# Patient Record
Sex: MALE | Race: BLACK OR AFRICAN AMERICAN | Employment: FULL TIME | URBAN - METROPOLITAN AREA
[De-identification: names, ages, dates, MRNs, and addresses within clinical notes are randomized per-mention and may not be internally consistent; named-entity substitution may affect disease eponyms.]

---

## 2018-06-27 ENCOUNTER — OFFICE VISIT (OUTPATIENT)
Dept: INTERNAL MEDICINE CLINIC | Facility: CLINIC | Age: 53
End: 2018-06-27

## 2018-06-27 VITALS
WEIGHT: 203 LBS | HEART RATE: 70 BPM | BODY MASS INDEX: 28.42 KG/M2 | DIASTOLIC BLOOD PRESSURE: 88 MMHG | RESPIRATION RATE: 16 BRPM | SYSTOLIC BLOOD PRESSURE: 139 MMHG | HEIGHT: 71 IN

## 2018-06-27 DIAGNOSIS — Z72.0 TOBACCO USE: ICD-10-CM

## 2018-06-27 DIAGNOSIS — R22.32 MASS OF SKIN OF LEFT SHOULDER: Primary | ICD-10-CM

## 2018-06-27 PROCEDURE — 99203 OFFICE O/P NEW LOW 30 MIN: CPT | Performed by: PHYSICIAN ASSISTANT

## 2018-06-27 NOTE — PROGRESS NOTES
HPI:    Patient ID: Juan Lewis is a 48year old male. HPI   Patient presents today to re-establish care and with some concerns of left shoulder lump. He was previously seen by Dr. Richar Hernandez but has been 6 years since his last visit. Overall doing okay. Gastrointestinal: Negative for nausea, vomiting, abdominal pain and diarrhea. Genitourinary: Negative for dysuria, frequency and hematuria. Musculoskeletal: Negative for myalgias, joint pain and gait problem.    Skin:        Large mass of skin of left s Patient here with large mass of the left neck/shoulder area for the last several weeks. Exam findings as above. Possible cyst versus lipoma. Overall he has not been bothered much by the mass until the last few weeks when it significantly enlarged.   Refe

## 2018-07-03 ENCOUNTER — OFFICE VISIT (OUTPATIENT)
Dept: SURGERY | Facility: CLINIC | Age: 53
End: 2018-07-03

## 2018-07-03 VITALS — BODY MASS INDEX: 28 KG/M2 | HEIGHT: 71 IN | WEIGHT: 200 LBS

## 2018-07-03 DIAGNOSIS — R22.1 MASS OF LEFT SIDE OF NECK: Primary | ICD-10-CM

## 2018-07-03 DIAGNOSIS — D18.00 HEMANGIOMA: ICD-10-CM

## 2018-07-03 PROCEDURE — 99212 OFFICE O/P EST SF 10 MIN: CPT | Performed by: SURGERY

## 2018-07-03 PROCEDURE — 99244 OFF/OP CNSLTJ NEW/EST MOD 40: CPT | Performed by: SURGERY

## 2018-07-03 NOTE — H&P
History and Physical      Renny Reesepool is a 48year old male. HPI   Patient presents with:  Mass: Patient referred by Mason Hidalgo PA-C for mass of skin of left shoulder.  Patient states he had a small mass there for \"awhile\" and over past 3-4 week of Systems    PHYSICAL EXAM   Ht 5' 11\" (1.803 m)   Wt 200 lb (90.7 kg)   BMI 27.89 kg/m²  No LMP for male patient.   Constitutional: appears well hydrated alert and responsive no acute distress noted  Head/Face: normocephalic  Nose/Mouth/Throat: nose and

## 2018-07-09 ENCOUNTER — TELEPHONE (OUTPATIENT)
Dept: SURGERY | Facility: CLINIC | Age: 53
End: 2018-07-09

## 2018-07-09 NOTE — TELEPHONE ENCOUNTER
[address], spoke with Kiersten Gallagher to initiate prior authorization for MRI Neck (CPT: [id number]) on 07/11/2018. Received case # [id number], further clinical information required. Given fax number [phone], clinicals faxed as requested.  Awaiting further i

## 2018-07-09 NOTE — TELEPHONE ENCOUNTER
Good afternoon,     Dr Serena Yates has ordered an MRI of the Neck (CPT 19341) for this patient. They are scheduled for 7/11/2018. Please obtain authorization through [employer] at [phone]. Call me if you have any questions.      Thank you,     [place]

## 2018-07-11 ENCOUNTER — HOSPITAL ENCOUNTER (OUTPATIENT)
Dept: MRI IMAGING | Age: 53
Discharge: HOME | End: 2018-07-11
Attending: SURGERY
Payer: COMMERCIAL

## 2018-07-11 DIAGNOSIS — R22.1 MASS OF LEFT SIDE OF NECK: ICD-10-CM

## 2018-07-11 DIAGNOSIS — D18.00 HEMANGIOMA: ICD-10-CM

## 2018-07-11 PROCEDURE — A9576 INJ PROHANCE MULTIPACK: HCPCS | Performed by: SURGERY

## 2018-07-11 PROCEDURE — 70543 MRI ORBT/FAC/NCK W/O &W/DYE: CPT | Performed by: SURGERY

## 2018-07-13 ENCOUNTER — TELEPHONE (OUTPATIENT)
Dept: INTERNAL MEDICINE CLINIC | Facility: CLINIC | Age: 53
End: 2018-07-13

## 2018-07-13 DIAGNOSIS — R22.2 SUPRACLAVICULAR MASS: Primary | ICD-10-CM

## 2018-07-13 NOTE — TELEPHONE ENCOUNTER
Pt called in requesting to speak with [address] directly in regards to his MRI results from 7/11.

## 2018-07-16 ENCOUNTER — TELEPHONE (OUTPATIENT)
Dept: SURGERY | Facility: CLINIC | Age: 53
End: 2018-07-16

## 2018-07-16 NOTE — TELEPHONE ENCOUNTER
----- Message from Merline Rudd, MD sent at 7/12/2018 12:56 PM CDT -----  MRI images reviewed and discussed with ENT and patient. I would recommend a tertiary care center eval with a head and neck cancer surgeon.  Operative approach may also require neurosu

## 2018-07-31 NOTE — TELEPHONE ENCOUNTER
Spoke with patient and relayed JSK message below. Patient states, \"Dr. Chucho Call was supposed to speak with Dr. Merritt Hawkins. I've been waiting on him to enter a referral to [facility] or [place] or whatever. I do want him to call me. \"

## 2018-07-31 NOTE — TELEPHONE ENCOUNTER
Please contact patient. Since this message, he spoke directly with Dr. Ethan Lopez regarding test results. He agreed to proceed with ENT referral at tertiary care center such as [facility]. Please make sure that the patient has made arrangements.   Let me k

## 2018-08-01 NOTE — TELEPHONE ENCOUNTER
I spoke with the patient. I have not seen the patient in about 6 years. I do not believe that I spoke directly with Dr. Lynette Zavala about this case. Patient with abnormal MRI and possible malignancy.   Dr. Lynette Zavala gave patient advised that he should see ENT at

## 2018-08-01 NOTE — TELEPHONE ENCOUNTER
I spoke with Dr. Serena Yates over the phone. Reviewed MRI results. I then spoke with Dr. Nicanor Keyes, 1 of our ENT doctors and she reviewed the findings of the MRI. She advised that the patient see Dr. Ethan Magaña at [facility].   He is the chief of

## 2018-08-08 NOTE — TELEPHONE ENCOUNTER
Please contact the patient. I spoke with the people at [facility] yesterday. They have been making multiple attempts to contact the patient in order to arrange follow-up with the oncology doctors and surgeons they are to treat his mass.   It is very important

## 2018-08-08 NOTE — TELEPHONE ENCOUNTER
Spoke with pt and Dr Kassidy Marcano message given. Pt states he was trying to call and felt like [facility] was giving him the [facility] number below given to pt and pt states he will call now.   Informed pt if he is having trouble scheduling to call ou

## 2018-08-09 NOTE — TELEPHONE ENCOUNTER
Stefan called back regarding  [facility]  Intake Nurse from Dr Larry Mendez office is asking to have an order/diagnoise code faxed #[phone]    Nurse strosa maria can get the pt in on Monday   Please advise

## 2018-08-10 ENCOUNTER — TELEPHONE (OUTPATIENT)
Dept: INTERNAL MEDICINE CLINIC | Facility: CLINIC | Age: 53
End: 2018-08-10

## 2018-08-10 NOTE — TELEPHONE ENCOUNTER
Spoke to patient this morning. States he was contacted by a nurse at [facility] yesterday to schedule an appointment but has not yet heard back from them.   This morning I spoke to Laura at [facility] and states he would have the nurse contact the patient before

## 2018-09-12 ENCOUNTER — TELEPHONE (OUTPATIENT)
Dept: INTERNAL MEDICINE CLINIC | Facility: CLINIC | Age: 53
End: 2018-09-12

## 2018-09-12 NOTE — TELEPHONE ENCOUNTER
Dr. Deanna Cannon called back regarding to speak to Dr. Kaity Weiner. Doctor asked if he can please call back.  [phone]

## 2018-09-12 NOTE — TELEPHONE ENCOUNTER
Dr Miguel Kim requesting Dr Renzo Junior call him tomorrow  To discuss pt's surgery, possible radiation treatment    Aware CORDELL not in office today- said not emergent & is fine to wait until  tomorrow        Ph# [phone]- to his  & will have

## 2018-09-17 NOTE — TELEPHONE ENCOUNTER
Dr. Bethany Tomlinson called back stts pt needs to start radiation and needs to see an oncologist. Pt wants an oncologist closer to home and Dr. Bethany Tomlinson is asking if Dr. Nohemi Stephens can help pt with this.        Dr. Bethany Tomlinson left a new call back #- [phone]

## 2018-09-18 NOTE — TELEPHONE ENCOUNTER
I spoke with Dr. Emre Hannon by phone. 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 Patient will need radiation but wants to do it closer to home.   I gave Dr. Emre Hannon the name of Dr. Luba Gunn, who would be the rad

## 2020-04-10 ENCOUNTER — TELEPHONE (OUTPATIENT)
Dept: INTERNAL MEDICINE CLINIC | Facility: CLINIC | Age: 55
End: 2020-04-10

## 2020-04-10 ENCOUNTER — NURSE TRIAGE (OUTPATIENT)
Dept: INTERNAL MEDICINE CLINIC | Facility: CLINIC | Age: 55
End: 2020-04-10

## 2020-04-10 NOTE — TELEPHONE ENCOUNTER
Patient called feeling better. Would like to review symptoms and management and would like a note for work. He has a phone visit scheduled for Monday. Patient in agreement with plan. Tried to call pt - not able to leave a voicemail - mailbox is full  Will try again later

## 2020-04-10 NOTE — TELEPHONE ENCOUNTER
Action Requested: Summary for Provider     []  Critical Lab, Recommendations Needed  [] Need Additional Advice  []   FYI    []   Need Orders  [] Need Medications Sent to Pharmacy  []  Other     SUMMARY: Zackary Eaves for Pasqual Sacks .  Pt stated that on Monday he chet

## 2020-04-10 NOTE — TELEPHONE ENCOUNTER
Spoke with patient--he is scheduled for virtual phone visit on Monday, but requesting recommendations until then    Sent to LOUISE Campbell    Please advise

## 2020-04-13 ENCOUNTER — TELEPHONE (OUTPATIENT)
Dept: INTERNAL MEDICINE CLINIC | Facility: CLINIC | Age: 55
End: 2020-04-13

## 2020-04-13 ENCOUNTER — VIRTUAL PHONE E/M (OUTPATIENT)
Dept: INTERNAL MEDICINE CLINIC | Facility: CLINIC | Age: 55
End: 2020-04-13
Payer: COMMERCIAL

## 2020-04-13 DIAGNOSIS — B34.9 VIRAL INFECTION: Primary | ICD-10-CM

## 2020-04-13 PROCEDURE — 99213 OFFICE O/P EST LOW 20 MIN: CPT | Performed by: NURSE PRACTITIONER

## 2020-04-13 NOTE — ASSESSMENT & PLAN NOTE
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 Last Tuesday he had a fever of 101 and was dizzy. He had no other symptoms. Denies, headache, body aches, sore throat, chills, nausea, vomiting or diarrhea.  Pa

## 2020-04-13 NOTE — TELEPHONE ENCOUNTER
Telephone Check-In    Stephanie Canela verbally consents to a Virtual/Telephone Check-In service on 04/13/20. Patient understands and accepts financial responsibility for any deductible, co-insurance and/or co-pays associated with this service.     Duration of symptoms or acute distress.

## 2020-04-13 NOTE — TELEPHONE ENCOUNTER
Patient calling stating he had a telephone visit with MING Hernandez today. Per patient, he was wondering how he can access letter written by MING Hernandez for his job to be off until 4/22/20. Patient informed letter sent in My Chart.    Patient  s

## 2020-04-14 NOTE — TELEPHONE ENCOUNTER
Called patient back. He is having trouble accessing myChart. Requesting letter to mail to his apartment. [id number].     Pearl Carrillo, ANP

## 2020-08-31 ENCOUNTER — LAB ENCOUNTER (OUTPATIENT)
Dept: LAB | Facility: HOSPITAL | Age: 55
End: 2020-08-31
Attending: INTERNAL MEDICINE
Payer: COMMERCIAL

## 2020-08-31 ENCOUNTER — OFFICE VISIT (OUTPATIENT)
Dept: INTERNAL MEDICINE CLINIC | Facility: CLINIC | Age: 55
End: 2020-08-31
Payer: COMMERCIAL

## 2020-08-31 VITALS
HEIGHT: 71 IN | SYSTOLIC BLOOD PRESSURE: 140 MMHG | BODY MASS INDEX: 28.98 KG/M2 | RESPIRATION RATE: 20 BRPM | DIASTOLIC BLOOD PRESSURE: 88 MMHG | WEIGHT: 207 LBS | HEART RATE: 96 BPM

## 2020-08-31 DIAGNOSIS — Z85.831 HISTORY OF SARCOMA: ICD-10-CM

## 2020-08-31 DIAGNOSIS — R20.2 TINGLING: ICD-10-CM

## 2020-08-31 DIAGNOSIS — Z00.00 ROUTINE PHYSICAL EXAMINATION: ICD-10-CM

## 2020-08-31 DIAGNOSIS — F17.200 TOBACCO USE DISORDER: ICD-10-CM

## 2020-08-31 DIAGNOSIS — Z00.00 ROUTINE PHYSICAL EXAMINATION: Primary | ICD-10-CM

## 2020-08-31 LAB
ALBUMIN SERPL-MCNC: 3.7 G/DL (ref 3.4–5)
ALBUMIN/GLOB SERPL: 1 {RATIO} (ref 1–2)
ALP LIVER SERPL-CCNC: 68 U/L (ref 45–117)
ALT SERPL-CCNC: 28 U/L (ref 16–61)
ANION GAP SERPL CALC-SCNC: 6 MMOL/L (ref 0–18)
AST SERPL-CCNC: 17 U/L (ref 15–37)
BASOPHILS # BLD AUTO: 0.05 X10(3) UL (ref 0–0.2)
BASOPHILS NFR BLD AUTO: 0.9 %
BILIRUB SERPL-MCNC: 0.2 MG/DL (ref 0.1–2)
BUN BLD-MCNC: 9 MG/DL (ref 7–18)
BUN/CREAT SERPL: 10.8 (ref 10–20)
CALCIUM BLD-MCNC: 9.3 MG/DL (ref 8.5–10.1)
CHLORIDE SERPL-SCNC: 109 MMOL/L (ref 98–112)
CHOLEST SMN-MCNC: 259 MG/DL (ref ?–200)
CO2 SERPL-SCNC: 27 MMOL/L (ref 21–32)
COMPLEXED PSA SERPL-MCNC: 0.44 NG/ML (ref ?–4)
CREAT BLD-MCNC: 0.83 MG/DL (ref 0.7–1.3)
DEPRECATED RDW RBC AUTO: 44.6 FL (ref 35.1–46.3)
EOSINOPHIL # BLD AUTO: 0.07 X10(3) UL (ref 0–0.7)
EOSINOPHIL NFR BLD AUTO: 1.2 %
ERYTHROCYTE [DISTWIDTH] IN BLOOD BY AUTOMATED COUNT: 12.9 % (ref 11–15)
GLOBULIN PLAS-MCNC: 3.7 G/DL (ref 2.8–4.4)
GLUCOSE BLD-MCNC: 81 MG/DL (ref 70–99)
HCT VFR BLD AUTO: 44.5 % (ref 39–53)
HDLC SERPL-MCNC: 59 MG/DL (ref 40–59)
HGB BLD-MCNC: 15.1 G/DL (ref 13–17.5)
IMM GRANULOCYTES # BLD AUTO: 0.01 X10(3) UL (ref 0–1)
IMM GRANULOCYTES NFR BLD: 0.2 %
LDLC SERPL CALC-MCNC: 167 MG/DL (ref ?–100)
LYMPHOCYTES # BLD AUTO: 0.95 X10(3) UL (ref 1–4)
LYMPHOCYTES NFR BLD AUTO: 16.3 %
M PROTEIN MFR SERPL ELPH: 7.4 G/DL (ref 6.4–8.2)
MCH RBC QN AUTO: 32.1 PG (ref 26–34)
MCHC RBC AUTO-ENTMCNC: 33.9 G/DL (ref 31–37)
MCV RBC AUTO: 94.7 FL (ref 80–100)
MONOCYTES # BLD AUTO: 0.77 X10(3) UL (ref 0.1–1)
MONOCYTES NFR BLD AUTO: 13.2 %
NEUTROPHILS # BLD AUTO: 3.99 X10 (3) UL (ref 1.5–7.7)
NEUTROPHILS # BLD AUTO: 3.99 X10(3) UL (ref 1.5–7.7)
NEUTROPHILS NFR BLD AUTO: 68.2 %
NONHDLC SERPL-MCNC: 200 MG/DL (ref ?–130)
OSMOLALITY SERPL CALC.SUM OF ELEC: 292 MOSM/KG (ref 275–295)
PATIENT FASTING Y/N/NP: YES
PATIENT FASTING Y/N/NP: YES
PLATELET # BLD AUTO: 286 10(3)UL (ref 150–450)
POTASSIUM SERPL-SCNC: 4.1 MMOL/L (ref 3.5–5.1)
RBC # BLD AUTO: 4.7 X10(6)UL (ref 4.3–5.7)
SODIUM SERPL-SCNC: 142 MMOL/L (ref 136–145)
TRIGL SERPL-MCNC: 165 MG/DL (ref 30–149)
TSI SER-ACNC: 0.56 MIU/ML (ref 0.36–3.74)
VLDLC SERPL CALC-MCNC: 33 MG/DL (ref 0–30)
WBC # BLD AUTO: 5.8 X10(3) UL (ref 4–11)

## 2020-08-31 PROCEDURE — 84443 ASSAY THYROID STIM HORMONE: CPT | Performed by: INTERNAL MEDICINE

## 2020-08-31 PROCEDURE — 80053 COMPREHEN METABOLIC PANEL: CPT | Performed by: INTERNAL MEDICINE

## 2020-08-31 PROCEDURE — 3008F BODY MASS INDEX DOCD: CPT | Performed by: INTERNAL MEDICINE

## 2020-08-31 PROCEDURE — 3077F SYST BP >= 140 MM HG: CPT | Performed by: INTERNAL MEDICINE

## 2020-08-31 PROCEDURE — 99396 PREV VISIT EST AGE 40-64: CPT | Performed by: INTERNAL MEDICINE

## 2020-08-31 PROCEDURE — 80061 LIPID PANEL: CPT | Performed by: INTERNAL MEDICINE

## 2020-08-31 PROCEDURE — 3079F DIAST BP 80-89 MM HG: CPT | Performed by: INTERNAL MEDICINE

## 2020-08-31 PROCEDURE — 85025 COMPLETE CBC W/AUTO DIFF WBC: CPT | Performed by: INTERNAL MEDICINE

## 2020-08-31 PROCEDURE — 36415 COLL VENOUS BLD VENIPUNCTURE: CPT | Performed by: INTERNAL MEDICINE

## 2020-08-31 RX ORDER — VARENICLINE TARTRATE 1 MG/1
1 TABLET, FILM COATED ORAL 2 TIMES DAILY
Qty: 60 TABLET | Refills: 6 | Status: SHIPPED | OUTPATIENT
Start: 2020-08-31 | End: 2021-03-29

## 2020-08-31 RX ORDER — AMOXICILLIN 500 MG/1
500 CAPSULE ORAL 3 TIMES DAILY
COMMUNITY

## 2020-08-31 RX ORDER — AMOXICILLIN 500 MG/1
500 CAPSULE ORAL 3 TIMES DAILY
COMMUNITY
Start: 2020-08-26 | End: 2020-08-31 | Stop reason: ALTCHOICE

## 2020-08-31 RX ORDER — PSEUDOEPHEDRINE HCL 30 MG
100 TABLET ORAL
COMMUNITY
Start: 2018-08-31

## 2020-08-31 NOTE — PROGRESS NOTES
HPI:    Patient ID: Dc Harris is a 50year old male. HPI  Patient is here requesting a physical exam.  Also follow-up on chronic medical issues. I last saw him back in 2012.   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 HENT: Negative for hearing loss. Eyes: Negative for visual disturbance. Respiratory: Negative for cough and shortness of breath. Cardiovascular: Negative for chest pain and palpitations. negative in the right inguinal area and confirmed negative in the left inguinal area. Genitourinary:    Testes and penis normal.   Circumcised. Musculoskeletal: He exhibits no tenderness.       Comments: Left supraclavicular area with surgical reconstruct reconstruction surgery in the left supraclavicular area. No evidence of recurrence on exam today. 4. Tingling  Atypical intermittent tingling of both posterior thighs. Distribution would not favor sciatica or lumbar radiculopathy.   May be some other l

## 2022-04-27 ENCOUNTER — OFFICE VISIT (OUTPATIENT)
Dept: INTERNAL MEDICINE CLINIC | Facility: CLINIC | Age: 57
End: 2022-04-27
Payer: COMMERCIAL

## 2022-04-27 VITALS
RESPIRATION RATE: 20 BRPM | SYSTOLIC BLOOD PRESSURE: 156 MMHG | BODY MASS INDEX: 29.82 KG/M2 | HEART RATE: 92 BPM | TEMPERATURE: 98 F | DIASTOLIC BLOOD PRESSURE: 92 MMHG | HEIGHT: 71 IN | WEIGHT: 213 LBS

## 2022-04-27 DIAGNOSIS — F17.200 TOBACCO USE DISORDER: ICD-10-CM

## 2022-04-27 DIAGNOSIS — E78.5 HYPERLIPIDEMIA, UNSPECIFIED HYPERLIPIDEMIA TYPE: ICD-10-CM

## 2022-04-27 DIAGNOSIS — Z00.00 ROUTINE PHYSICAL EXAMINATION: Primary | ICD-10-CM

## 2022-04-27 DIAGNOSIS — M79.604 RIGHT LEG PAIN: ICD-10-CM

## 2022-04-27 DIAGNOSIS — R03.0 ELEVATED BP WITHOUT DIAGNOSIS OF HYPERTENSION: ICD-10-CM

## 2022-04-27 DIAGNOSIS — R20.2 TINGLING IN EXTREMITIES: ICD-10-CM

## 2022-04-27 DIAGNOSIS — M79.602 LEFT ARM PAIN: ICD-10-CM

## 2022-04-27 PROCEDURE — 3080F DIAST BP >= 90 MM HG: CPT | Performed by: INTERNAL MEDICINE

## 2022-04-27 PROCEDURE — 99396 PREV VISIT EST AGE 40-64: CPT | Performed by: INTERNAL MEDICINE

## 2022-04-27 PROCEDURE — 3077F SYST BP >= 140 MM HG: CPT | Performed by: INTERNAL MEDICINE

## 2022-04-27 PROCEDURE — 3008F BODY MASS INDEX DOCD: CPT | Performed by: INTERNAL MEDICINE

## 2022-08-23 NOTE — PROGRESS NOTES
Doing well. HPI:    Patient ID: Delilah Moreland is a 50year old male. HPI Viral Syndrome  50year old male who works in the [employer] in [facility]. Three co-workers have tested positive for covid-19.  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 Last Tuesday he had a fever of 101 and was dizzy. He had no other symptoms.  Denies, headache, body ach

## 2023-05-31 ENCOUNTER — OFFICE VISIT (OUTPATIENT)
Dept: INTERNAL MEDICINE CLINIC | Facility: CLINIC | Age: 58
End: 2023-05-31

## 2023-05-31 VITALS
SYSTOLIC BLOOD PRESSURE: 158 MMHG | HEIGHT: 71 IN | WEIGHT: 214 LBS | DIASTOLIC BLOOD PRESSURE: 98 MMHG | BODY MASS INDEX: 29.96 KG/M2 | OXYGEN SATURATION: 95 % | RESPIRATION RATE: 18 BRPM | HEART RATE: 81 BPM

## 2023-05-31 DIAGNOSIS — I10 ESSENTIAL HYPERTENSION: Primary | ICD-10-CM

## 2023-05-31 DIAGNOSIS — M79.605 LEFT LEG PAIN: ICD-10-CM

## 2023-05-31 DIAGNOSIS — M77.11 LATERAL EPICONDYLITIS OF RIGHT ELBOW: ICD-10-CM

## 2023-05-31 PROCEDURE — 99214 OFFICE O/P EST MOD 30 MIN: CPT | Performed by: INTERNAL MEDICINE

## 2023-05-31 PROCEDURE — 3080F DIAST BP >= 90 MM HG: CPT | Performed by: INTERNAL MEDICINE

## 2023-05-31 PROCEDURE — 3077F SYST BP >= 140 MM HG: CPT | Performed by: INTERNAL MEDICINE

## 2023-05-31 PROCEDURE — 3008F BODY MASS INDEX DOCD: CPT | Performed by: INTERNAL MEDICINE

## 2023-05-31 RX ORDER — AMLODIPINE BESYLATE 5 MG/1
5 TABLET ORAL DAILY
Qty: 30 TABLET | Refills: 5 | Status: SHIPPED | OUTPATIENT
Start: 2023-05-31

## 2024-01-20 ENCOUNTER — OFFICE VISIT (OUTPATIENT)
Dept: INTERNAL MEDICINE CLINIC | Facility: CLINIC | Age: 59
End: 2024-01-20

## 2024-01-20 VITALS
WEIGHT: 219 LBS | HEART RATE: 94 BPM | HEIGHT: 71 IN | TEMPERATURE: 98 F | RESPIRATION RATE: 20 BRPM | DIASTOLIC BLOOD PRESSURE: 100 MMHG | SYSTOLIC BLOOD PRESSURE: 156 MMHG | BODY MASS INDEX: 30.66 KG/M2

## 2024-01-20 DIAGNOSIS — F17.200 TOBACCO USE DISORDER: ICD-10-CM

## 2024-01-20 DIAGNOSIS — Z12.11 COLON CANCER SCREENING: ICD-10-CM

## 2024-01-20 DIAGNOSIS — E78.5 HYPERLIPIDEMIA, UNSPECIFIED HYPERLIPIDEMIA TYPE: ICD-10-CM

## 2024-01-20 DIAGNOSIS — Z00.00 ROUTINE PHYSICAL EXAMINATION: Primary | ICD-10-CM

## 2024-01-20 DIAGNOSIS — I10 ESSENTIAL HYPERTENSION: ICD-10-CM

## 2024-01-20 PROCEDURE — 3080F DIAST BP >= 90 MM HG: CPT | Performed by: INTERNAL MEDICINE

## 2024-01-20 PROCEDURE — 3077F SYST BP >= 140 MM HG: CPT | Performed by: INTERNAL MEDICINE

## 2024-01-20 PROCEDURE — 99396 PREV VISIT EST AGE 40-64: CPT | Performed by: INTERNAL MEDICINE

## 2024-01-20 PROCEDURE — 3008F BODY MASS INDEX DOCD: CPT | Performed by: INTERNAL MEDICINE

## 2024-01-20 RX ORDER — AMLODIPINE BESYLATE 5 MG/1
5 TABLET ORAL DAILY
Qty: 30 TABLET | Refills: 5 | Status: SHIPPED | OUTPATIENT
Start: 2024-01-20

## 2024-01-20 NOTE — PROGRESS NOTES
HPI:    Patient ID: Vitaly Dumont is a 58 year old male.    HPI  Patient is here requesting general physical exam and follow-up on chronic medical issues as listed below.  Last seen here on May 31 of last year.  At that time blood pressure 168/96.  He was started on amlodipine 5 mg a day.  Advised to follow-up in 1 to 3 months.  He returns today.  Also with history of hyperlipidemia.  Sarcoma with surgery back in 2018.  Also with tobacco use.  Health maintenance and vaccination status reviewed.  He is overdue on multiple issues.    Has not been taking BP meds regularly; takes them now but not daily. He is not good with taking pills. Diet is not a lot of salt. Not checking BP. Pt walks for exercise; 20 -30 minutes about 3 days a week. Weight has been steadily going up. Still smoking about a half PPD.     Patient Active Problem List   Diagnosis    Tobacco use    History of sarcoma          HISTORY:  Past Medical History:   Diagnosis Date    Colon polyps       Past Surgical History:   Procedure Laterality Date    COLONOSCOPY  06/11/2012    EGD  06/11/2012    EXCIS PRIMARY GANGLION WRIST Right 1980    SPINE FUSION,ANTER,3 SGMTS  10/2010    C3 - C7    TONSILLECTOMY  1969    VASECTOMY  2008      History reviewed. No pertinent family history.   Social History     Socioeconomic History    Marital status:     Number of children: 4   Occupational History    Occupation:    Tobacco Use    Smoking status: Every Day     Packs/day: 0.50     Years: 17.00     Additional pack years: 0.00     Total pack years: 8.50     Types: Cigarettes    Smokeless tobacco: Never    Tobacco comments:     patch did not work per pt.   Substance and Sexual Activity    Alcohol use: Yes          Review of Systems          Current Outpatient Medications   Medication Sig Dispense Refill    amLODIPine 5 MG Oral Tab Take 1 tablet (5 mg total) by mouth daily. 30 tablet 5     Allergies:No Known Allergies     PHYSICAL EXAM:   BP (!) 176/98 (BP  Location: Left arm, Patient Position: Sitting, Cuff Size: large)   Pulse 94   Temp 97.6 °F (36.4 °C) (Other)   Resp 20   Ht 5' 11\" (1.803 m)   Wt 219 lb (99.3 kg)   BMI 30.54 kg/m²      Physical Exam  Constitutional:       Appearance: Normal appearance. He is well-developed.   HENT:      Right Ear: Tympanic membrane and ear canal normal.      Left Ear: Tympanic membrane and ear canal normal.      Nose: Nose normal.      Mouth/Throat:      Pharynx: No oropharyngeal exudate or posterior oropharyngeal erythema.   Eyes:      Conjunctiva/sclera: Conjunctivae normal.      Pupils: Pupils are equal, round, and reactive to light.   Neck:      Thyroid: No thyromegaly.      Vascular: No carotid bruit.   Cardiovascular:      Rate and Rhythm: Normal rate and regular rhythm.      Pulses: Normal pulses.      Heart sounds: Normal heart sounds. No murmur heard.  Pulmonary:      Effort: Pulmonary effort is normal.      Breath sounds: Normal breath sounds. No wheezing or rales.   Abdominal:      General: Bowel sounds are normal.      Palpations: Abdomen is soft. There is no mass.      Tenderness: There is no abdominal tenderness.      Hernia: There is no hernia in the left inguinal area.   Genitourinary:     Penis: Normal and circumcised.       Testes: Normal.   Musculoskeletal:      Right lower leg: No edema.      Left lower leg: No edema.   Lymphadenopathy:      Cervical: No cervical adenopathy.   Skin:     General: Skin is warm and dry.      Findings: No rash.   Neurological:      General: No focal deficit present.      Mental Status: He is alert.      Cranial Nerves: No cranial nerve deficit.      Coordination: Coordination normal.   Psychiatric:         Mood and Affect: Mood normal.         Behavior: Behavior normal.         Thought Content: Thought content normal.         Judgment: Judgment normal.          Wt Readings from Last 6 Encounters:   01/20/24 219 lb (99.3 kg)   05/31/23 214 lb (97.1 kg)   04/27/22 213 lb (96.6  kg)   08/31/20 207 lb (93.9 kg)   07/03/18 200 lb (90.7 kg)   06/27/18 203 lb (92.1 kg)             ASSESSMENT/PLAN:   1. Routine physical examination  Physical exam remarkable for elevated blood pressure.  Somewhat overweight status.  Encouraged diet, exercise, weight loss.  Will check fasting blood work and contact patient with results.  - CBC With Differential With Platelet  - Comp Metabolic Panel (14)  - Lipid Panel  - TSH W Reflex To Free T4  - PSA Total, Screen; Future    2. Essential hypertension  Blood pressure remains elevated.  He is not compliant with his medications.  Advised in very certain terms that he must take his amlodipine 5 mg every day.  Poorly controlled blood pressure puts him at risk for heart attack, stroke, and other health issues.  Given printed information and access to educational videos about lifestyle and high blood pressure.  Follow-up in 3 months.  If still elevated at that time, consider addition of chlorthalidone or increasing dose of amlodipine.    3. Hyperlipidemia, unspecified hyperlipidemia type  Check levels.  On no medication right now.    4. Tobacco use disorder  Strongly encourage smoking cessation.  May consider low-dose CT scan screening in future visits.    5. Colon cancer screening  Patient is overdue for follow-up with colon cancer screening.  Given order.  - [facility] GI Telephone Colon Screen         Meds This Visit:  Requested Prescriptions      No prescriptions requested or ordered in this encounter       Imaging & Referrals:  None         Dilshad Schilling MD

## 2024-01-20 NOTE — PATIENT INSTRUCTIONS
Controlling High Blood Pressure   High blood pressure (hypertension) is often called the silent killer. This is because many people who have it, don’t know it. It can be very dangerous. High blood pressure can raise your risk of heart attack, stroke, heart disease, and heart failure. Controlling your blood pressure can lower your risk of these problems. It's important to check yourblood pressure regularly. It can save your life.   Blood pressure measurements are given as 2 numbers. Systolic blood pressure is the upper number. This is the pressure when the heart contracts. Diastolic blood pressure is the lower number. This is the pressure when the heartrelaxes between beats.   Blood pressure is grouped like this:   Normal blood pressure. This is systolic of less than 120 and diastolic of less than 80 (120/80).  Elevated blood pressure.  This is systolic of 120 to 129 and diastolic less than 80.  Stage 1 high blood pressure.  This is systolic of 130 to 139 or diastolic between 80 to 89.  Stage 2 high blood pressure.  This is systolic of 140 or higher or diastolic of 90 or higher.  A heart-healthy lifestyle can help you control your blood pressure withoutmedicines. Below are some things you can do to have a heart-healthy lifestyle.     Eat heart-healthy foods   Choose low-salt, low-fat foods. Limit your sodium to 2,300 mg per day or the amount advised by your healthcare provider.  Limit canned, dried, cured, packaged, and fast foods. These can contain a lot of salt.  Eat 8 to 10 servings of fruits and vegetables every day.  Choose lean meats, fish, or chicken.  Eat whole-grain pasta, brown rice, and beans.  Eat 2 to 3 servings of low-fat or fat-free dairy products.  Ask your doctor about the DASH eating plan. This plan helps reduce blood pressure.  When you go to a restaurant, ask that your meal be made with no added salt.    Stay at a healthy weight   Ask your healthcare provider how many calories to eat a day. Then  stick to that number.  Ask your provider what weight range is healthiest for you. If you are overweight, a weight loss of only 3% to 5% of your body weight can help lower blood pressure. A good weight loss goal is to lose 10% of your body weight in a year.  Limit snacks and sweets.  Get regular exercise.    Get more active   Find activities you enjoy. They can be done alone or with friends or family. Try bicycling, dancing, walking, or jogging.  Park farther away from building entrances to walk more.  Use stairs instead of the elevator.  When you can, walk or bike instead of driving.  [place] leaves, garden, or do household repairs.  Be active at a moderate to vigorous level of physical activity for at least 30 minutes a day for at least 5 days a week.     Manage stress   Make time to relax and enjoy life. Find time to laugh.  Talk about your concerns with your loved ones and your healthcare provider.  Visit with family and friends, and keep up with hobbies.    Limit alcohol and quit smoking   Men should have no more than 2 drinks per day.  Women should have no more than 1 drink per day.  If you smoke, make a plan to stop. Talk with your healthcare provider for help. Smoking greatly raises your risk for heart disease and stroke. Ask your provider about stop-smoking programs and other support.    Blood pressure medicines  If your lifestyle changes aren’t enough, your healthcare provider may prescribe high blood pressure medicine. Take all medicines as prescribed. If you have any questions about yourmedicines, ask your provider before stopping or changing them.   hike last reviewed this educational content on12/1/2021 © 2000-2022 [employer]. All rights reserved. This information is not intended as a substitute for professional medical care. Always follow [facility] professional's instruction    Video HealthSheets™  What is High Blood Pressure?  Understand what blood pressure is, the health risks  of having high blood pressure, the factors that put you at risk for having high blood pressure, and the importance of working with your healthcare provider to control it.  To watch the video:  Scan the QR code  Using your mobile device, scan the following code:  OR  Go to the website:  Quick Hit  Enter the prescription code:  3414E    © 2000-2022 [employer]. All rights reserved. This information is not intended as a substitute for professional medical care. Always follow your healthcare professional's instructions.    Video Get10  Eating Well with High Blood Pressure  Certain foods can make your blood pressure go too high. Watch and learn how easy it is to have delicious meals without harming your health.     To watch the video:  Scan the QR code  Using your mobile device, scan the following code:  OR  Go to the website:  www.kramesvideo.com  Enter the prescription code:   QIX       © 2000-2022 [employer]. All rights reserved. This information is not intended as a substitute for professional medical care. Always follow your healthcare professional's instructions.    Taking Your Blood Pressure  Blood pressure is the force of blood against the artery wall as it moves from the heart through the blood vessels. You can take your own blood pressure reading using a digital monitor. Take your readings the same each time, usingthe same arm. Take readings as often as your healthcare provider advises.   About blood pressure monitors  Blood pressure monitors are designed for certain ages and cases. You can find monitors for older adults, for pregnant women, and for children. Make sure theone you choose is the right one for your age and situation.   Experts advise an automatic cuff monitor that fits on your upper arm (bicep). The cuff should fit your arm size. A cuff that’s too large or too small won't give an accurate reading. Measure around yourupper arm to find your  size.   Monitors that attach to your finger or wrist are not as accurate as monitorsfor your upper arm.   Ask your healthcare provider for help in choosing a monitor. Bring your monitorto your next provider visit if you need help in using it the correct way.   The steps below are general instructions for using an automatic digitalmonitor.   Step 1. Relax    Take your blood pressure at the same time every day, such as in the morning or evening. Or take it at the time your healthcare provider advises.  Wait at least 30 minutes after smoking, eating, or exercising. Don't drink coffee, tea, soda, or other caffeinated drinks before checking your blood pressure. Use the restroom beforehand.  Sit comfortably at a table with both feet on the floor. Don't cross your legs or feet. Place the monitor near you.  Rest for at least 5 minutes before you begin. Make sure there are no distractions. This includes TV, cell phones, and other electronics. Wait to have conversations with others until after you measure you blood pressure.  Step 2. Wrap the cuff    Place your arm on the table, palm up. Your arm should be at the level of your heart. Wrap the cuff around your upper arm, just above your elbow. It’s best done on bare skin, not over clothing. Most cuffs will show you where the blood vessel in the middle of the arm at the inner side of the elbow (the brachial artery) should line up with the cuff. Look in your monitor's instruction booklet for an illustration. You can also bring your cuff to your healthcare provider and have them show you how to correctly place the cuff.  Step 3. Inflate the cuff    Push the button that starts the pump.  The cuff will tighten, then loosen.  The numbers will change. When they stop changing, your blood pressure reading will appear.  Take 2 or 3 readings 1 minute apart, or as advised by your provider.  Step 4. Write down the results of each reading    Write down your blood pressure numbers for each  reading. Note the date and time. Keep your results in 1 place, such as a notebook. Even if your monitor has a built-in memory, keep a hard copy of the readings.  Remove the cuff from your arm. Turn off the machine.  Bring your blood pressure records with you to each provider visit.  If you start a new blood pressure medicine, note the day you started the new medicine. Also note the day if you change the dose of your medicine. Measure your blood pressure before your take your medicine. This information goes on your blood pressure recording sheet. This will help your provider check how well the medicine changes are working.  Ask your provider what numbers mean that you should call them. Also ask what numbers mean that you should get help right away.  Sheila last reviewed this educational content on12/1/2021 © 2000-2022 [employer]. All rights reserved. This information is not intended as a substitute for professional medical care. Always follow yourhealthcare professional's instructions.

## 2024-03-28 ENCOUNTER — TELEPHONE (OUTPATIENT)
Dept: INTERNAL MEDICINE CLINIC | Facility: CLINIC | Age: 59
End: 2024-03-28

## 2024-04-04 NOTE — TELEPHONE ENCOUNTER
Please try to avoid signing forms in the corner as it is not visible when printing and forms are not accepted this way. Thank you!     Dr. Schilling,      *The ACKNOWLEDGE button has been moved to the top right ribbon*    Please sign off on form if you agree to: FMLA - 6 mths due to Change in medication pt is    (place your signature on the first page only)    -From your Inbasket, Highlight the patient and click Chart   -Double click the 03/28/2024 Forms Completion telephone encounter  -Scroll down to the Media section   -Click the blue Hyperlink: DEMARIO Schilling 04/04/2024  -Click Acknowledge located in the top right ribbon/menu   -Drag the mouse into the blank space of the document and a + sign will appear. Left click to   electronically sign the document.     Thank you,  Liset GIL

## 2024-04-13 ENCOUNTER — LAB ENCOUNTER (OUTPATIENT)
Dept: LAB | Age: 59
End: 2024-04-13
Attending: INTERNAL MEDICINE
Payer: COMMERCIAL

## 2024-04-13 DIAGNOSIS — Z00.00 ROUTINE PHYSICAL EXAMINATION: ICD-10-CM

## 2024-04-13 LAB
ALBUMIN SERPL-MCNC: 4.7 G/DL (ref 3.2–4.8)
ALBUMIN/GLOB SERPL: 1.7 {RATIO} (ref 1–2)
ALP LIVER SERPL-CCNC: 68 U/L
ALT SERPL-CCNC: 22 U/L
ANION GAP SERPL CALC-SCNC: 7 MMOL/L (ref 0–18)
AST SERPL-CCNC: 19 U/L (ref ?–34)
BASOPHILS # BLD AUTO: 0.07 X10(3) UL (ref 0–0.2)
BASOPHILS NFR BLD AUTO: 0.8 %
BILIRUB SERPL-MCNC: 0.6 MG/DL (ref 0.3–1.2)
BUN BLD-MCNC: 11 MG/DL (ref 9–23)
BUN/CREAT SERPL: 12.2 (ref 10–20)
CALCIUM BLD-MCNC: 10 MG/DL (ref 8.7–10.4)
CHLORIDE SERPL-SCNC: 108 MMOL/L (ref 98–112)
CHOLEST SERPL-MCNC: 311 MG/DL (ref ?–200)
CO2 SERPL-SCNC: 27 MMOL/L (ref 21–32)
COMPLEXED PSA SERPL-MCNC: 0.6 NG/ML (ref ?–4)
CREAT BLD-MCNC: 0.9 MG/DL
DEPRECATED RDW RBC AUTO: 45.1 FL (ref 35.1–46.3)
EGFRCR SERPLBLD CKD-EPI 2021: 100 ML/MIN/1.73M2 (ref 60–?)
EOSINOPHIL # BLD AUTO: 0.05 X10(3) UL (ref 0–0.7)
EOSINOPHIL NFR BLD AUTO: 0.6 %
ERYTHROCYTE [DISTWIDTH] IN BLOOD BY AUTOMATED COUNT: 13 % (ref 11–15)
FASTING PATIENT LIPID ANSWER: YES
FASTING STATUS PATIENT QL REPORTED: YES
GLOBULIN PLAS-MCNC: 2.7 G/DL (ref 2.8–4.4)
GLUCOSE BLD-MCNC: 101 MG/DL (ref 70–99)
HCT VFR BLD AUTO: 44.7 %
HDLC SERPL-MCNC: 62 MG/DL (ref 40–59)
HGB BLD-MCNC: 15.4 G/DL
IMM GRANULOCYTES # BLD AUTO: 0.01 X10(3) UL (ref 0–1)
IMM GRANULOCYTES NFR BLD: 0.1 %
LDLC SERPL CALC-MCNC: 215 MG/DL (ref ?–100)
LYMPHOCYTES # BLD AUTO: 2.26 X10(3) UL (ref 1–4)
LYMPHOCYTES NFR BLD AUTO: 27.2 %
MCH RBC QN AUTO: 32.4 PG (ref 26–34)
MCHC RBC AUTO-ENTMCNC: 34.5 G/DL (ref 31–37)
MCV RBC AUTO: 94.1 FL
MONOCYTES # BLD AUTO: 1.05 X10(3) UL (ref 0.1–1)
MONOCYTES NFR BLD AUTO: 12.6 %
NEUTROPHILS # BLD AUTO: 4.88 X10 (3) UL (ref 1.5–7.7)
NEUTROPHILS # BLD AUTO: 4.88 X10(3) UL (ref 1.5–7.7)
NEUTROPHILS NFR BLD AUTO: 58.7 %
NONHDLC SERPL-MCNC: 249 MG/DL (ref ?–130)
OSMOLALITY SERPL CALC.SUM OF ELEC: 294 MOSM/KG (ref 275–295)
PLATELET # BLD AUTO: 282 10(3)UL (ref 150–450)
POTASSIUM SERPL-SCNC: 4.3 MMOL/L (ref 3.5–5.1)
PROT SERPL-MCNC: 7.4 G/DL (ref 5.7–8.2)
RBC # BLD AUTO: 4.75 X10(6)UL
SODIUM SERPL-SCNC: 142 MMOL/L (ref 136–145)
TRIGL SERPL-MCNC: 179 MG/DL (ref 30–149)
TSI SER-ACNC: 1.19 MIU/ML (ref 0.55–4.78)
VLDLC SERPL CALC-MCNC: 40 MG/DL (ref 0–30)
WBC # BLD AUTO: 8.3 X10(3) UL (ref 4–11)

## 2024-04-13 PROCEDURE — 36415 COLL VENOUS BLD VENIPUNCTURE: CPT

## 2024-04-13 PROCEDURE — 80053 COMPREHEN METABOLIC PANEL: CPT | Performed by: INTERNAL MEDICINE

## 2024-04-13 PROCEDURE — 84443 ASSAY THYROID STIM HORMONE: CPT | Performed by: INTERNAL MEDICINE

## 2024-04-13 PROCEDURE — 85025 COMPLETE CBC W/AUTO DIFF WBC: CPT | Performed by: INTERNAL MEDICINE

## 2024-04-13 PROCEDURE — 80061 LIPID PANEL: CPT | Performed by: INTERNAL MEDICINE

## 2024-04-22 ENCOUNTER — OFFICE VISIT (OUTPATIENT)
Facility: CLINIC | Age: 59
End: 2024-04-22

## 2024-04-22 VITALS
OXYGEN SATURATION: 98 % | BODY MASS INDEX: 30.24 KG/M2 | WEIGHT: 216 LBS | HEIGHT: 71 IN | HEART RATE: 97 BPM | SYSTOLIC BLOOD PRESSURE: 144 MMHG | DIASTOLIC BLOOD PRESSURE: 86 MMHG

## 2024-04-22 DIAGNOSIS — F17.200 TOBACCO USE DISORDER: ICD-10-CM

## 2024-04-22 DIAGNOSIS — E78.5 HYPERLIPIDEMIA, UNSPECIFIED HYPERLIPIDEMIA TYPE: ICD-10-CM

## 2024-04-22 DIAGNOSIS — I10 ESSENTIAL HYPERTENSION: Primary | ICD-10-CM

## 2024-04-22 DIAGNOSIS — Z13.6 SCREENING FOR HEART DISEASE: ICD-10-CM

## 2024-04-22 PROCEDURE — 99214 OFFICE O/P EST MOD 30 MIN: CPT | Performed by: INTERNAL MEDICINE

## 2024-04-22 RX ORDER — ATORVASTATIN CALCIUM 10 MG/1
10 TABLET, FILM COATED ORAL NIGHTLY
Qty: 90 TABLET | Refills: 1 | Status: SHIPPED | OUTPATIENT
Start: 2024-04-22

## 2024-04-22 RX ORDER — AMLODIPINE BESYLATE 10 MG/1
10 TABLET ORAL DAILY
Qty: 90 TABLET | Refills: 1 | Status: SHIPPED | OUTPATIENT
Start: 2024-04-22

## 2024-04-22 NOTE — PROGRESS NOTES
HPI:    Patient ID: Vitaly Dumont is a 59 year old male.    HPI  Patient is here for follow-up on chronic medical issues mainly concerning hypertension and hyperlipidemia.  Last seen in the office in January 23 at that time blood pressure considerably elevated at 176/98 and 156/100.  Patient admitted to noncompliance with amlodipine.  Advised to take it every day as prescribed.  He had full blood work done about a week ago.  Things look good except for the lipid profile.  Total cholesterol 311 with LDL of 215, HDL 62, triglycerides 179.  Current medications reviewed.  Health maintenance and vaccination status reviewed.     The first week of daily amlodipine made him feel tired. It has improved. He is taking the amlodipine daily. Not checking BP. Sometimes he feels drowsy. Diet is average. For exercise, he walks. Still smoking 1/2 PPD. EtOH is average- about 1-2 beers a day. No chest pain or SOB.       Patient Active Problem List   Diagnosis    Tobacco use    History of sarcoma          HISTORY:  Past Medical History:    Colon polyps      Past Surgical History:   Procedure Laterality Date    Colonoscopy  06/11/2012    Egd  06/11/2012    Excis primary ganglion wrist Right 1980    Spine fusion,anter,3 sgmts  10/2010    C3 - C7    Tonsillectomy  1969    Vasectomy  2008      No family history on file.   Social History     Socioeconomic History    Marital status:     Number of children: 4   Occupational History    Occupation:    Tobacco Use    Smoking status: Every Day     Current packs/day: 0.50     Average packs/day: 0.5 packs/day for 17.0 years (8.5 ttl pk-yrs)     Types: Cigarettes    Smokeless tobacco: Never    Tobacco comments:     patch did not work per pt.   Substance and Sexual Activity    Alcohol use: Yes          Review of Systems          Current Outpatient Medications   Medication Sig Dispense Refill    amLODIPine 5 MG Oral Tab Take 1 tablet (5 mg total) by mouth daily. 30 tablet 5      Allergies:No Known Allergies     PHYSICAL EXAM:   /86   Pulse 97   Ht 5' 11\" (1.803 m)   Wt 216 lb (98 kg)   SpO2 98%   BMI 30.13 kg/m²      Physical Exam  Constitutional:       Appearance: Normal appearance. He is well-developed.   HENT:      Nose: Nose normal.      Mouth/Throat:      Pharynx: No oropharyngeal exudate or posterior oropharyngeal erythema.   Eyes:      Conjunctiva/sclera: Conjunctivae normal.   Neck:      Vascular: No carotid bruit.   Cardiovascular:      Rate and Rhythm: Normal rate and regular rhythm.      Pulses: Normal pulses.      Heart sounds: Normal heart sounds. No murmur heard.  Pulmonary:      Effort: Pulmonary effort is normal.      Breath sounds: Normal breath sounds. No wheezing or rales.   Abdominal:      General: Bowel sounds are normal.      Palpations: Abdomen is soft. There is no mass.      Tenderness: There is no abdominal tenderness.   Musculoskeletal:      Right lower leg: No edema.      Left lower leg: No edema.   Lymphadenopathy:      Cervical: No cervical adenopathy.   Skin:     General: Skin is warm and dry.      Findings: No rash.   Neurological:      General: No focal deficit present.      Mental Status: He is alert.   Psychiatric:         Mood and Affect: Mood normal.         Behavior: Behavior normal.         Thought Content: Thought content normal.          Wt Readings from Last 6 Encounters:   04/22/24 216 lb (98 kg)   01/20/24 219 lb (99.3 kg)   05/31/23 214 lb (97.1 kg)   04/27/22 213 lb (96.6 kg)   08/31/20 207 lb (93.9 kg)   07/03/18 200 lb (90.7 kg)             ASSESSMENT/PLAN:   1. Essential hypertension  Blood pressure is not adequately controlled.  Various options discussed including starting chlorthalidone.  Patient prefers just to stay on the amlodipine but increase the dose from 5 to 10 mg a day.  We will do that.  Follow-up in 3 months.  - EKG 12 Lead    2. Hyperlipidemia, unspecified hyperlipidemia type  Extremely high total cholesterol and  LDL cholesterol.  Patient at high risk for cardiovascular event.  Various options discussed.  Start patient on atorvastatin 10 mg a day.  Follow-up in 3 months with repeat blood work.  Work on diet and exercise.  - ALT(SGPT); Future  - AST (SGOT); Future  - Lipid Panel; Future    3. Tobacco use disorder  Strongly encouraged to quit smoking.  Patient has multiple risk factors for cardiovascular disease.    4. Screening for heart disease  Patient at high risk for occult heart disease.  Various options discussed.  Asymptomatic.  Check EKG.  Order cardiac calcium scan.  - CT CALCIUM SCORING; Future         Meds This Visit:  Requested Prescriptions      No prescriptions requested or ordered in this encounter       Imaging & Referrals:  None         Dilshad Schilling MD

## 2024-07-08 ENCOUNTER — TELEPHONE (OUTPATIENT)
Dept: INTERNAL MEDICINE CLINIC | Facility: CLINIC | Age: 59
End: 2024-07-08

## 2024-07-08 DIAGNOSIS — Z12.11 COLON CANCER SCREENING: Primary | ICD-10-CM

## 2024-07-08 NOTE — TELEPHONE ENCOUNTER
Patient due for his colonoscopy and blood pressure check. Patient last seen in office on 4/22/24, next office visit scheduled on 7/22/24. Referral pending in EMR [facility] please advise.

## 2024-09-05 ENCOUNTER — EKG ENCOUNTER (OUTPATIENT)
Dept: LAB | Facility: HOSPITAL | Age: 59
End: 2024-09-05
Attending: INTERNAL MEDICINE
Payer: COMMERCIAL

## 2024-09-05 DIAGNOSIS — I10 HTN (HYPERTENSION): Primary | ICD-10-CM

## 2024-09-05 LAB
ATRIAL RATE: 97 BPM
P AXIS: 65 DEGREES
P-R INTERVAL: 142 MS
Q-T INTERVAL: 326 MS
QRS DURATION: 82 MS
QTC CALCULATION (BEZET): 414 MS
R AXIS: 34 DEGREES
T AXIS: 70 DEGREES
VENTRICULAR RATE: 97 BPM

## 2024-09-05 PROCEDURE — 93010 ELECTROCARDIOGRAM REPORT: CPT | Performed by: INTERNAL MEDICINE

## 2024-09-05 PROCEDURE — 93005 ELECTROCARDIOGRAM TRACING: CPT

## 2024-09-13 ENCOUNTER — OFFICE VISIT (OUTPATIENT)
Dept: INTERNAL MEDICINE CLINIC | Facility: CLINIC | Age: 59
End: 2024-09-13

## 2024-09-13 VITALS
HEIGHT: 71 IN | DIASTOLIC BLOOD PRESSURE: 80 MMHG | WEIGHT: 218 LBS | RESPIRATION RATE: 16 BRPM | BODY MASS INDEX: 30.52 KG/M2 | SYSTOLIC BLOOD PRESSURE: 140 MMHG | HEART RATE: 90 BPM

## 2024-09-13 DIAGNOSIS — Z13.6 SCREENING FOR HEART DISEASE: ICD-10-CM

## 2024-09-13 DIAGNOSIS — E78.5 HYPERLIPIDEMIA, UNSPECIFIED HYPERLIPIDEMIA TYPE: ICD-10-CM

## 2024-09-13 DIAGNOSIS — I10 ESSENTIAL HYPERTENSION: Primary | ICD-10-CM

## 2024-09-13 PROCEDURE — 99214 OFFICE O/P EST MOD 30 MIN: CPT | Performed by: NURSE PRACTITIONER

## 2024-09-13 NOTE — PROGRESS NOTES
Vitaly Dumont is a 59 year old male.  Chief Complaint   Patient presents with    Test Results     HPI:   He presents for follow up. He has a history of hyperlipidemia and HTN. He saw his PCP on 4/2024 and his BP medication was increased and he was started on Lipitor 10mg daily.     His BP is elevated in office today. He states it is due to being tired, he does work nights. He typically takes his BP medication in the AM and has not had his dose this morning.       Current Outpatient Medications   Medication Sig Dispense Refill    amLODIPine 10 MG Oral Tab Take 1 tablet (10 mg total) by mouth daily. 90 tablet 1    atorvastatin 10 MG Oral Tab Take 1 tablet (10 mg total) by mouth nightly. 90 tablet 1      Past Medical History:    Colon polyps      Past Surgical History:   Procedure Laterality Date    Colonoscopy  06/11/2012    Egd  06/11/2012    Excis primary ganglion wrist Right 1980    Spine fusion,anter,3 sgmts  10/2010    C3 - C7    Tonsillectomy  1969    Vasectomy  2008      Social History:  Social History     Socioeconomic History    Marital status:     Number of children: 4   Occupational History    Occupation:    Tobacco Use    Smoking status: Every Day     Current packs/day: 0.50     Average packs/day: 0.5 packs/day for 17.0 years (8.5 ttl pk-yrs)     Types: Cigarettes    Smokeless tobacco: Never    Tobacco comments:     patch did not work per pt.   Substance and Sexual Activity    Alcohol use: Yes      No family history on file.   No Known Allergies     REVIEW OF SYSTEMS:     Review of Systems   Constitutional:  Negative for fever.   HENT: Negative.     Respiratory:  Negative for cough, shortness of breath and wheezing.    Cardiovascular:  Negative for chest pain.   Gastrointestinal:  Negative for abdominal pain.   Genitourinary: Negative.    Musculoskeletal: Negative.    Skin: Negative.    Neurological: Negative.    Psychiatric/Behavioral: Negative.        Wt Readings from Last 5 Encounters:    09/13/24 218 lb (98.9 kg)   04/22/24 216 lb (98 kg)   01/20/24 219 lb (99.3 kg)   05/31/23 214 lb (97.1 kg)   04/27/22 213 lb (96.6 kg)     Body mass index is 30.4 kg/m².      EXAM:   /88   Pulse 90   Resp 16   Ht 5' 11\" (1.803 m)   Wt 218 lb (98.9 kg)   BMI 30.40 kg/m²     Physical Exam  Vitals reviewed.   Constitutional:       Appearance: Normal appearance.   HENT:      Head: Normocephalic.   Cardiovascular:      Rate and Rhythm: Normal rate and regular rhythm.      Pulses: Normal pulses.   Pulmonary:      Breath sounds: Normal breath sounds. No wheezing.   Musculoskeletal:         General: No swelling. Normal range of motion.      Cervical back: Normal range of motion and neck supple.   Skin:     General: Skin is warm and dry.   Neurological:      Mental Status: He is alert and oriented to person, place, and time.   Psychiatric:         Mood and Affect: Mood normal.         Behavior: Behavior normal.            ASSESSMENT AND PLAN:   1. Essential hypertension  - continue Norvasc 10mg daily  - monitor BP at home and send a [employer] message with results  - checked BP manually in office and it was 140/80.   - EKG completed on 9/5/2024 showed NSR    2. Hyperlipidemia, unspecified hyperlipidemia type  - recheck lipid panel and liver enzymes since starting Lipitor 10mg daily  - orders placed by PCP  - dw patient to have the blood work completed when fasting   - monitor diet and exercise as tolerated     3. Screening for heart disease  - order for CT calcium score   - reminded patient to schedule testing       The patient indicates understanding of these issues and agrees to the plan.  No follow-ups on file.

## 2024-09-27 ENCOUNTER — TELEPHONE (OUTPATIENT)
Dept: INTERNAL MEDICINE CLINIC | Facility: CLINIC | Age: 59
End: 2024-09-27

## 2024-09-27 NOTE — TELEPHONE ENCOUNTER
RONNY VILLATORO PT CAME IN TO DROP OFF ADDITIONAL FORMS FOR FMLA FROM [employer] FORMS EMAILED AND SENT TO FORMS DEPT

## 2024-10-07 NOTE — TELEPHONE ENCOUNTER
Family Medical Leave Act forms received in forms dept. Logged for processing. Valid Release of Information on file.

## 2024-10-10 NOTE — TELEPHONE ENCOUNTER
Called patient, left message to call back. Please confirm with patient if Family Medical Leave Act recert, also get start date and end date.

## 2024-10-11 NOTE — TELEPHONE ENCOUNTER
Patient calling back to return call, Family Medical Leave Act is recertification per previous paperwork. Start 9/13/24 - end 9/13/25. Same details as previous.

## 2024-10-14 NOTE — TELEPHONE ENCOUNTER
Dr. Schilling,    Please sign off on form if you agree to: Re-certification on Intermittent Family Medical Leave Act due to hypertension. 1-4 flare ups a month, each lasting 8-24hrs. Start date 09/13/24-09/13/25.    -Signature page will be the first page scanned  -From your Inbasket, Highlight the patient and click Chart   -Double click the 09/27/2024 Forms Completion telephone encounter  -Scroll down to the Media section   -Click the blue Hyperlink: Family Medical Leave Act Dr Schilling 10/14/24  -Click Acknowledge located in the top right ribbon/menu   -Drag the mouse into the blank space of the document and a + sign will appear. Left click to   electronically sign the document.  -Once signed, simply exit out of the screen and you signature will be saved.     Thank you,    Valery

## 2024-10-21 NOTE — TELEPHONE ENCOUNTER
Called patient to inform forms have been completed. Per documentation, patient wanted a call to inform when forms have been completed. Called patient to inform.  Family Medical [phone]

## 2025-03-10 RX ORDER — ATORVASTATIN CALCIUM 10 MG/1
10 TABLET, FILM COATED ORAL NIGHTLY
Qty: 90 TABLET | Refills: 0 | Status: SHIPPED | OUTPATIENT
Start: 2025-03-10

## 2025-03-10 RX ORDER — AMLODIPINE BESYLATE 10 MG/1
10 TABLET ORAL DAILY
Qty: 90 TABLET | Refills: 0 | Status: SHIPPED | OUTPATIENT
Start: 2025-03-10

## 2025-03-10 NOTE — TELEPHONE ENCOUNTER
Refill passed per Clinic protocol.  Requested Prescriptions   Pending Prescriptions Disp Refills    AMLODIPINE 10 MG Oral Tab [Pharmacy Med Name: AMLODIPINE BESYLATE 10MG TABLETS] 90 tablet 1     Sig: TAKE 1 TABLET(10 MG) BY MOUTH DAILY       Hypertension Medications Protocol Failed - 3/10/2025  3:19 PM        Failed - Last BP reading less than 140/90     BP Readings from Last 1 Encounters:   09/13/24 140/80               Passed - CMP or BMP in past 12 months        Passed - In person appointment or virtual visit in the past 12 mos or appointment in next 3 mos     Recent Outpatient Visits              5 months ago Essential hypertension    [facility], [facility] [place]    Office Visit    10 months ago Essential hypertension    [facility], [facility], Dilshad Coronado MD    Office Visit    1 year ago Routine physical examination    [facility], Dilshad Coronado MD    Office Visit    1 year ago Essential hypertension    [facility], Dilshad Coronado MD    Office Visit    2 years ago Routine physical examination    [facility] Dilshad Coronado MD    Office Visit                      Passed - EGFRCR or GFRAA > 50     GFR Evaluation  EGFRCR: 100 , resulted on 4/13/2024          Passed - Medication is active on med list          ATORVASTATIN 10 MG Oral Tab [Pharmacy Med Name: ATORVASTATIN 10MG TABLETS] 90 tablet 1     Sig: TAKE 1 TABLET(10 MG) BY MOUTH EVERY NIGHT       Cholesterol Medication Protocol Passed - 3/10/2025  3:19 PM        Passed - ALT < 80     Lab Results   Component Value Date    ALT 22 04/13/2024             Passed - ALT resulted within past year        Passed - Lipid panel within past 12 months     Lab Results   Component Value Date    CHOLEST 311 (H) 04/13/2024    TRIG 179 (H) 04/13/2024    HDL  62 (H) 04/13/2024     (H) 04/13/2024    VLDL 40 (H) 04/13/2024    NONHDLC 249 (H) 04/13/2024             Passed - In person appointment or virtual visit in the past 12 mos or appointment in next 3 mos     Recent Outpatient Visits              5 months ago Essential hypertension    [place], [facility], [place]    Office Visit    10 months ago Essential hypertension    [place], [facility], Dilshad Coronado MD    Office Visit    1 year ago Routine physical examination    [facility], Dilshad Coronado MD    Office Visit    1 year ago Essential hypertension    [facility], Dilshad Coronado MD    Office Visit    2 years ago Routine physical examination    [facility], Dilshad Coronado MD    Office Visit                      Passed - Medication is active on med list

## (undated) NOTE — LETTER
Kushal Haider,     This is the [facility], office of Dr. Dilshad Schilling    Thank you for putting your trust in [facility].  Our goal is to deliver the highest quality healthcare and an exceptional patient experience. Upon reviewing of your medical record shows you are due for the following:      Colonoscopy  Blood pressure follow up         Please call [phone] to schedule your appointment or schedule online via [employer].     If you changed to a new provider at another facility, please notify the clinic to update your records.    If you had any recent testing at another facility, please have your results faxed to our office at [phone].           Thank you and have a great day!